# Patient Record
Sex: MALE | Race: WHITE | NOT HISPANIC OR LATINO | URBAN - METROPOLITAN AREA
[De-identification: names, ages, dates, MRNs, and addresses within clinical notes are randomized per-mention and may not be internally consistent; named-entity substitution may affect disease eponyms.]

---

## 2024-02-05 ENCOUNTER — TELEPHONE (OUTPATIENT)
Dept: PULMONOLOGY | Facility: CLINIC | Age: 69
End: 2024-02-05
Payer: MEDICARE

## 2024-02-05 DIAGNOSIS — R06.02 SOB (SHORTNESS OF BREATH): Primary | ICD-10-CM

## 2024-02-05 NOTE — TELEPHONE ENCOUNTER
----- Message from Pamella Moreno sent at 2/5/2024  2:24 PM CST -----  Contact: Marika/Spouse  Type:  Patient Returning Call    Who Called:Marika  Who Left Message for Patient:unknown  Does the patient know what this is regarding?:unknown  Would the patient rather a call back or a response via [employer]? call  Best Call Back Number:[phone]   Additional Information: Patient's spouse reports patient will arrive for visit on 2/6/24 at 8:15 am.   Thank you,  GH

## 2024-02-06 ENCOUNTER — TELEPHONE (OUTPATIENT)
Dept: PULMONOLOGY | Facility: CLINIC | Age: 69
End: 2024-02-06

## 2024-02-06 ENCOUNTER — OFFICE VISIT (OUTPATIENT)
Dept: PULMONOLOGY | Facility: CLINIC | Age: 69
End: 2024-02-06
Payer: MEDICARE

## 2024-02-06 VITALS
HEIGHT: 65 IN | SYSTOLIC BLOOD PRESSURE: 122 MMHG | BODY MASS INDEX: 27.2 KG/M2 | OXYGEN SATURATION: 97 % | RESPIRATION RATE: 18 BRPM | HEART RATE: 69 BPM | WEIGHT: 163.25 LBS | DIASTOLIC BLOOD PRESSURE: 60 MMHG

## 2024-02-06 DIAGNOSIS — R09.02 EXERCISE HYPOXEMIA: ICD-10-CM

## 2024-02-06 DIAGNOSIS — R06.02 SOB (SHORTNESS OF BREATH): Primary | ICD-10-CM

## 2024-02-06 DIAGNOSIS — R07.9 ACUTE CHEST PAIN: ICD-10-CM

## 2024-02-06 DIAGNOSIS — R06.09 DOE (DYSPNEA ON EXERTION): Primary | ICD-10-CM

## 2024-02-06 DIAGNOSIS — J31.0 CHRONIC RHINITIS: ICD-10-CM

## 2024-02-06 PROCEDURE — 1160F RVW MEDS BY RX/DR IN RCRD: CPT | Mod: CPTII,S$GLB,, | Performed by: INTERNAL MEDICINE

## 2024-02-06 PROCEDURE — 1126F AMNT PAIN NOTED NONE PRSNT: CPT | Mod: CPTII,S$GLB,, | Performed by: INTERNAL MEDICINE

## 2024-02-06 PROCEDURE — 3008F BODY MASS INDEX DOCD: CPT | Mod: CPTII,S$GLB,, | Performed by: INTERNAL MEDICINE

## 2024-02-06 PROCEDURE — 3074F SYST BP LT 130 MM HG: CPT | Mod: CPTII,S$GLB,, | Performed by: INTERNAL MEDICINE

## 2024-02-06 PROCEDURE — 3078F DIAST BP <80 MM HG: CPT | Mod: CPTII,S$GLB,, | Performed by: INTERNAL MEDICINE

## 2024-02-06 PROCEDURE — 3288F FALL RISK ASSESSMENT DOCD: CPT | Mod: CPTII,S$GLB,, | Performed by: INTERNAL MEDICINE

## 2024-02-06 PROCEDURE — 1101F PT FALLS ASSESS-DOCD LE1/YR: CPT | Mod: CPTII,S$GLB,, | Performed by: INTERNAL MEDICINE

## 2024-02-06 PROCEDURE — 99999 PR PBB SHADOW E&M-EST. PATIENT-LVL V: CPT | Mod: PBBFAC,,, | Performed by: INTERNAL MEDICINE

## 2024-02-06 PROCEDURE — 99205 OFFICE O/P NEW HI 60 MIN: CPT | Mod: 25,S$GLB,, | Performed by: INTERNAL MEDICINE

## 2024-02-06 PROCEDURE — 1159F MED LIST DOCD IN RCRD: CPT | Mod: CPTII,S$GLB,, | Performed by: INTERNAL MEDICINE

## 2024-02-06 RX ORDER — GABAPENTIN ENACARBIL 600 MG/1
1 TABLET, EXTENDED RELEASE ORAL NIGHTLY
COMMUNITY
Start: 2024-01-21

## 2024-02-06 RX ORDER — CARVEDILOL 6.25 MG/1
6.25 TABLET ORAL
COMMUNITY

## 2024-02-06 RX ORDER — CLOPIDOGREL BISULFATE 75 MG/1
75 TABLET ORAL
COMMUNITY
Start: 2023-12-14

## 2024-02-06 RX ORDER — ACETAMINOPHEN 500 MG
1 TABLET ORAL DAILY
COMMUNITY

## 2024-02-06 RX ORDER — NITROGLYCERIN 0.4 MG/1
TABLET SUBLINGUAL
COMMUNITY
Start: 2024-01-03

## 2024-02-06 RX ORDER — PRAMIPEXOLE DIHYDROCHLORIDE 1 MG/1
1 TABLET ORAL NIGHTLY
COMMUNITY

## 2024-02-06 RX ORDER — PANTOPRAZOLE SODIUM 40 MG/1
1 TABLET, DELAYED RELEASE ORAL EVERY MORNING
COMMUNITY
Start: 2023-03-07

## 2024-02-06 RX ORDER — CELECOXIB 200 MG/1
200 CAPSULE ORAL
COMMUNITY
Start: 2024-01-02 | End: 2024-02-06 | Stop reason: SDUPTHER

## 2024-02-06 RX ORDER — CELECOXIB 200 MG/1
200 CAPSULE ORAL 2 TIMES DAILY
COMMUNITY

## 2024-02-06 RX ORDER — ROSUVASTATIN CALCIUM 40 MG/1
1 TABLET, COATED ORAL EVERY MORNING
COMMUNITY

## 2024-02-06 RX ORDER — SILODOSIN 4 MG/1
CAPSULE ORAL
COMMUNITY
Start: 2023-09-27

## 2024-02-06 RX ORDER — IPRATROPIUM BROMIDE 21 UG/1
2 SPRAY, METERED NASAL 3 TIMES DAILY PRN
Qty: 30 ML | Refills: 11 | Status: SHIPPED | OUTPATIENT
Start: 2024-02-06

## 2024-02-06 RX ORDER — TAMSULOSIN HYDROCHLORIDE 0.4 MG/1
1 CAPSULE ORAL
COMMUNITY

## 2024-02-06 RX ORDER — CARVEDILOL 6.25 MG/1
6.25 TABLET ORAL 2 TIMES DAILY
COMMUNITY
End: 2024-02-06 | Stop reason: SDUPTHER

## 2024-02-06 NOTE — LETTER
February 6, 2024      Sergio Michelle MD  [address]  [facility] [address]           [facility]  [address] [place], [place] [address] [address]  Phone: [phone]  Fax: [phone]          Patient: Mushtaq Morales   MR Number: [id number]   YOB: 1955   Date of Visit: 2/6/2024           Thank you for referring Mushtaq Morales to me for evaluation. Attached you will find relevant portions of my assessment and plan of care.    If you have questions, please do not hesitate to call me. I look forward to following Mushtaq Morales along with you.    Sincerely,    Rey Hoskins MD    Enclosure  CC:  No Recipients    If you would like to receive this communication electronically, please contact [email or web address] or [phone] to request [email or web address] access.    [email or web address] Link is a tool which provides read-only access to select patient information with whom you have a relationship. It's easy to use and provides real time access to review your patients record including encounter summaries, notes, results, and demographic information.    If you feel you have received this communication in error or would no longer like to receive these types of communications, please e-mail [email or web address]

## 2024-02-06 NOTE — PROGRESS NOTES
Subjective:     Patient ID: Mushtaq Morales is a 68 y.o. male.    Chief Complaint:  anxiety, shortness of breath     HPI   Occasional cough    Dyspnea  Patient complains of shortness of breath. Symptoms occur while getting dressed, after one flight stairs, with more than one block walking. Symptoms began 4 years ago, gradually worsening since. Associated symptoms include  drainage from nose, dry cough, dyspnea on exertion, post nasal drip, shortness of breath, and wheezing. He denies chest pain, located left chest. He has had recent travel. Weight has been stable. Symptoms are exacerbated by moderate activity. Symptoms are alleviated by rest.     Second hand smoke - exposure as teenager   No history of asthma    Sees  Dr. Michelle - cardiology  Nuclear stress test recently was ok  Coronary Artery Disease with stent in 2/2022    History of Obstructive Sleep Apnea - not using Continuous Positive Airway Pressure regularly     Past Medical History:   Diagnosis Date    BPH (benign prostatic hyperplasia)     CAD (coronary artery disease)     Hyperlipidemia      Past Surgical History:   Procedure Laterality Date    ABDOMINAL SURGERY       Review of patient's allergies indicates:   Allergen Reactions    Olanzapine Other (See Comments)     drowsiness     Current Outpatient Medications on File Prior to Visit   Medication Sig Dispense Refill    carvediloL (COREG) 6.25 MG tablet Take 6.25 mg by mouth.      celecoxib (CELEBREX) 200 MG capsule Take 200 mg by mouth 2 (two) times daily.      cholecalciferol, vitamin D3, (VITAMIN D3) 50 mcg (2,000 unit) Cap capsule Take 1 capsule by mouth once daily.      clopidogreL (PLAVIX) 75 mg tablet Take 75 mg by mouth.      HORIZANT 600 mg TbSR Take 1 tablet by mouth every evening.      nitroGLYCERIN (NITROSTAT) 0.4 MG SL tablet Place under the tongue.      pantoprazole (PROTONIX) 40 MG tablet Take 1 tablet by mouth every morning.      pramipexole (MIRAPEX) 1 MG tablet Take 1 mg by mouth every  "evening.      rosuvastatin (CRESTOR) 40 MG Tab Take 1 tablet by mouth every morning.      silodosin (RAPAFLO) 4 mg Cap capsule TAKE 1 CAPSULE IN THE MORNING (DUE FOR OFFICE VISIT FOR NEXT REFILL)      tamsulosin (FLOMAX) 0.4 mg Cap Take 1 capsule by mouth.      testosterone enanthate 75 mg/0.5 mL AtIn Inject into the skin.      [DISCONTINUED] celecoxib (CELEBREX) 200 MG capsule Take 200 mg by mouth.      [DISCONTINUED] carvediloL (COREG) 6.25 MG tablet 6.25 mg 2 (two) times daily.       No current facility-administered medications on file prior to visit.     Social History     Socioeconomic History    Marital status:    Tobacco Use    Smoking status: Never    Smokeless tobacco: Never   Substance and Sexual Activity    Alcohol use: Yes     Comment: occasional     Family History   Problem Relation Age of Onset    Heart failure Mother     Heart failure Father        Review of Systems   HENT:  Positive for postnasal drip.    Respiratory:  Positive for shortness of breath and dyspnea on extertion.        Objective:      /60   Pulse 69   Resp 18   Ht 5' 5" (1.651 m)   Wt 74 kg (163 lb 4 oz)   SpO2 97%   BMI 27.17 kg/m²   Physical Exam  Vitals and nursing note reviewed.   Constitutional:       Appearance: He is well-developed.   HENT:      Head: Normocephalic and atraumatic.      Nose: Congestion present.   Eyes:      Conjunctiva/sclera: Conjunctivae normal.      Pupils: Pupils are equal, round, and reactive to light.   Neck:      Thyroid: No thyromegaly.      Vascular: No JVD.      Trachea: No tracheal deviation.   Cardiovascular:      Rate and Rhythm: Normal rate and regular rhythm.      Heart sounds: Normal heart sounds.   Pulmonary:      Effort: Pulmonary effort is normal.      Breath sounds: Normal breath sounds.   Abdominal:      Palpations: Abdomen is soft.   Musculoskeletal:         General: Normal range of motion.      Cervical back: Neck supple.   Lymphadenopathy:      Cervical: No cervical " "adenopathy.   Skin:     General: Skin is warm and dry.   Neurological:      Mental Status: He is alert and oriented to person, place, and time.   Psychiatric:         Mood and Affect: Mood normal.         Behavior: Behavior normal.       Personal Diagnostic Review  Chest x-ray: wnl          2/6/2024     8:39 AM   Pulmonary Studies Review   SpO2 97 %   Height 5' 5" (1.651 m)   Weight 74 kg (163 lb 4 oz)   BMI (Calculated) 27.2   Predicted Distance 355.55   Predicted Distance Meters (Calculated) 464.1 meters       X-Ray Chest PA And Lateral  Narrative: EXAM:  XR CHEST PA AND LATERAL    CLINICAL HISTORY:  Shortness of breath    FINDINGS:    Comparison 12/10/2023.    2 views.    The heart is normal in size.    The lungs are clear.    Previous cervical spine surgery.  Impression: Clear lungs.    Finalized on: 2/6/2024 9:04 AM By:  Edmar Irby MD  [facility]# [id number]      2024-02-06 09:06:26.523    [facility]      Office Spirometry Results:         2/6/2024     8:39 AM   Pulmonary Function Tests   SpO2 97 %   Height 5' 5" (1.651 m)   Weight 74 kg (163 lb 4 oz)   BMI (Calculated) 27.2         2/6/2024     8:39 AM   Pulmonary Studies Review   SpO2 97 %   Height 5' 5" (1.651 m)   Weight 74 kg (163 lb 4 oz)   BMI (Calculated) 27.2   Predicted Distance 355.55   Predicted Distance Meters (Calculated) 464.1 meters       REVIEW OF OUTSIDE RECORDS:  CT Abdomen Pelvis with IV Contrast Only    Anatomical Region Laterality Modality   Abdomen -- Computed Tomography   Pelvis -- --     Impression      1.  Scattered few air-fluid levels in nondilated small bowel, possibly ileus or enteritis.  2.  Mild mild/moderate scattered stool and mild gas in large bowel.  3.  Stable mild splenomegaly.  4.  Persistent nonspecific prominent somewhat lobular, nodular and heterogeneous prostate gland.  Narrative    HISTORY:      hx SBO,  nausea/burping/frequent diarrhea        EXAM:    CT ABDOMEN PELVIS W IV CONTRAST    COMPARISON:    August 30, " 2022.    TECHNIQUE:    Automated exposure control was used for dose reduction.    Evaluation of the abdomen includes the visualized lower thorax, liver, spleen, pancreas, adrenal glands, kidneys, aorta/retroperitoneal structures, stomach, small bowel, large bowel as well as the surrounding osseous and soft tissues.    Evaluation of the pelvis includes the bladder, ureters, retroperitoneal structures, rectum/large bowel, small bowel as well as surrounding osseous and soft tissues.    FINDINGS:    *  Some linear atelectasis or scarring left base is unchanged.  *  Again postop change GE junction level.  *  Mild diffuse fatty change of liver.  *  Mild splenomegaly with spleen 13.6 cm length, similar to previous.  *  Prominent, somewhat lobular nodular heterogeneous prostate gland.  *  Postop cholecystectomy.  *  Penile prosthesis remains.  *  Scattered few air-fluid levels in nondilated small bowel.  *  Apparent postop appendectomy.  *  Mild to mild/moderate scattered stool in the colon with mild scattered gas.  *  Postop change laminectomy with posterior and interbody fusion L5-S1. Mild/moderate degenerative change in the lower spine and right femoral joint.  Procedure Note    Parish Overton MD - 11/06/2022  Formatting of this note might be different from the original.  HISTORY:      hx SBO,  nausea/burping/frequent diarrhea        EXAM:    CT ABDOMEN PELVIS W IV CONTRAST    COMPARISON:    August 30, 2022.    TECHNIQUE:    Automated exposure control was used for dose reduction.    Evaluation of the abdomen includes the visualized lower thorax, liver, spleen, pancreas, adrenal glands, kidneys, aorta/retroperitoneal structures, stomach, small bowel, large bowel as well as the surrounding osseous and soft tissues.    Evaluation of the pelvis includes the bladder, ureters, retroperitoneal structures, rectum/large bowel, small bowel as well as surrounding osseous and soft tissues.    FINDINGS:    *  Some linear atelectasis  or scarring left base is unchanged.  *  Again postop change GE junction level.  *  Mild diffuse fatty change of liver.  *  Mild splenomegaly with spleen 13.6 cm length, similar to previous.  *  Prominent, somewhat lobular nodular heterogeneous prostate gland.  *  Postop cholecystectomy.  *  Penile prosthesis remains.  *  Scattered few air-fluid levels in nondilated small bowel.  *  Apparent postop appendectomy.  *  Mild to mild/moderate scattered stool in the colon with mild scattered gas.  *  Postop change laminectomy with posterior and interbody fusion L5-S1. Mild/moderate degenerative change in the lower spine and right femoral joint.      IMPRESSION:    1.  Scattered few air-fluid levels in nondilated small bowel, possibly ileus or enteritis.  2.  Mild mild/moderate scattered stool and mild gas in large bowel.  3.  Stable mild splenomegaly.  4.  Persistent nonspecific prominent somewhat lobular, nodular and heterogeneous prostate gland.  Exam End: 22 13:43 Last Resulted: 22 15:34   Received From: [facility]           Assessment:            ALEX (dyspnea on exertion)  -     Complete PFT with bronchodilator; Future; Expected date: 2024  -     Six Minute Walk Test to qualify for Home Oxygen; Future  -     D-Dimer, Quantitative; Future; Expected date: 2024  -     Basic Metabolic Panel; Future; Expected date: 2024  -     CTA Chest Non-Coronary (PE Studies); Future; Expected date: 2024  -     Fraction of  Nitric Oxide; Future    Exercise hypoxemia  -     Six Minute Walk Test to qualify for Home Oxygen; Future  -     D-Dimer, Quantitative; Future; Expected date: 2024  -     Basic Metabolic Panel; Future; Expected date: 2024  -     CTA Chest Non-Coronary (PE Studies); Future; Expected date: 2024    Acute chest pain  -     D-Dimer, Quantitative; Future; Expected date: 2024  -     Basic  Metabolic Panel; Future; Expected date: 2024  -     CTA Chest Non-Coronary (PE Studies); Future; Expected date: 2024  -     CTA Chest Non-Coronary (PE Studies); Future; Expected date: 2024    Chronic rhinitis  -     Fraction of  Nitric Oxide; Future  -     X-Ray Sinuses Min 3 Views; Future; Expected date: 2024  -     ipratropium (ATROVENT) 21 mcg (0.03 %) nasal spray; 2 sprays by Each Nostril route 3 (three) times daily as needed for Rhinitis (sinsus congestion).  Dispense: 30 mL; Refill: 11          Outpatient Encounter Medications as of 2024   Medication Sig Dispense Refill    carvediloL (COREG) 6.25 MG tablet Take 6.25 mg by mouth.      celecoxib (CELEBREX) 200 MG capsule Take 200 mg by mouth 2 (two) times daily.      cholecalciferol, vitamin D3, (VITAMIN D3) 50 mcg (2,000 unit) Cap capsule Take 1 capsule by mouth once daily.      clopidogreL (PLAVIX) 75 mg tablet Take 75 mg by mouth.      HORIZANT 600 mg TbSR Take 1 tablet by mouth every evening.      nitroGLYCERIN (NITROSTAT) 0.4 MG SL tablet Place under the tongue.      pantoprazole (PROTONIX) 40 MG tablet Take 1 tablet by mouth every morning.      pramipexole (MIRAPEX) 1 MG tablet Take 1 mg by mouth every evening.      rosuvastatin (CRESTOR) 40 MG Tab Take 1 tablet by mouth every morning.      silodosin (RAPAFLO) 4 mg Cap capsule TAKE 1 CAPSULE IN THE MORNING (DUE FOR OFFICE VISIT FOR NEXT REFILL)      tamsulosin (FLOMAX) 0.4 mg Cap Take 1 capsule by mouth.      testosterone enanthate 75 mg/0.5 mL AtIn Inject into the skin.      [DISCONTINUED] celecoxib (CELEBREX) 200 MG capsule Take 200 mg by mouth.      ipratropium (ATROVENT) 21 mcg (0.03 %) nasal spray 2 sprays by Each Nostril route 3 (three) times daily as needed for Rhinitis (sinsus congestion). 30 mL 11    [DISCONTINUED] carvediloL (COREG) 6.25 MG tablet 6.25 mg 2 (two) times daily.       No facility-administered encounter medications on file as of 2024.     Plan:        Requested Prescriptions     Signed Prescriptions Disp Refills    ipratropium (ATROVENT) 21 mcg (0.03 %) nasal spray 30 mL 11     Si sprays by Each Nostril route 3 (three) times daily as needed for Rhinitis (sinsus congestion).     Problem List Items Addressed This Visit    None  Visit Diagnoses       ALEX (dyspnea on exertion)    -  Primary    Relevant Orders    Complete PFT with bronchodilator    Six Minute Walk Test to qualify for Home Oxygen    D-Dimer, Quantitative    Basic Metabolic Panel    CTA Chest Non-Coronary (PE Studies)    Fraction of  Nitric Oxide    Exercise hypoxemia        Relevant Orders    Six Minute Walk Test to qualify for Home Oxygen    D-Dimer, Quantitative    Basic Metabolic Panel    CTA Chest Non-Coronary (PE Studies)    Acute chest pain        Relevant Orders    D-Dimer, Quantitative    Basic Metabolic Panel    CTA Chest Non-Coronary (PE Studies)    CTA Chest Non-Coronary (PE Studies)    Chronic rhinitis        Relevant Medications    ipratropium (ATROVENT) 21 mcg (0.03 %) nasal spray    Other Relevant Orders    Fraction of  Nitric Oxide    X-Ray Sinuses Min 3 Views               Follow up in about 2 weeks (around 2024) for CT - ASAP, Review progress.    MEDICAL DECISION MAKING: Moderate to high complexity.  Overall, the multiple problems listed are of moderate to high severity that may impact quality of life and activities of daily living. Side effects of medications, treatment plan as well as options and alternatives reviewed and discussed with patient. There was counseling of patient concerning these issues.    Total time spent in counseling and coordination of care - 60  minutes of total time spent on the encounter, which includes face to face time and non-face to face time preparing to see the patient (eg, review of tests), Obtaining and/or reviewing separately obtained history, Documenting clinical information in the electronic or other health record,  Independently interpreting results (not separately reported) and communicating results to the patient/family/caregiver, or Care coordination (not separately reported).    Time was used in discussion of prognosis, risks, benefits of treatment, instructions and compliance with regimen . Discussion with other physicians and/or health care providers - home health or for use of durable medical equipment (oxygen, nebulizers, CPAP, BiPAP) occurred.